# Patient Record
Sex: MALE | Race: WHITE | HISPANIC OR LATINO | URBAN - METROPOLITAN AREA
[De-identification: names, ages, dates, MRNs, and addresses within clinical notes are randomized per-mention and may not be internally consistent; named-entity substitution may affect disease eponyms.]

---

## 2023-04-27 ENCOUNTER — APPOINTMENT (RX ONLY)
Dept: URBAN - METROPOLITAN AREA CLINIC 133 | Facility: CLINIC | Age: 33
Setting detail: DERMATOLOGY
End: 2023-04-27

## 2023-04-27 DIAGNOSIS — L663 OTHER SPECIFIED DISEASES OF HAIR AND HAIR FOLLICLES: ICD-10-CM

## 2023-04-27 DIAGNOSIS — L738 OTHER SPECIFIED DISEASES OF HAIR AND HAIR FOLLICLES: ICD-10-CM

## 2023-04-27 DIAGNOSIS — L73.9 FOLLICULAR DISORDER, UNSPECIFIED: ICD-10-CM

## 2023-04-27 DIAGNOSIS — L72.8 OTHER FOLLICULAR CYSTS OF THE SKIN AND SUBCUTANEOUS TISSUE: ICD-10-CM

## 2023-04-27 PROBLEM — L02.223 FURUNCLE OF CHEST WALL: Status: ACTIVE | Noted: 2023-04-27

## 2023-04-27 PROCEDURE — ? ADDITIONAL NOTES

## 2023-04-27 PROCEDURE — ? INTRALESIONAL KENALOG

## 2023-04-27 PROCEDURE — ? COUNSELING

## 2023-04-27 PROCEDURE — ? PRESCRIPTION

## 2023-04-27 PROCEDURE — 99203 OFFICE O/P NEW LOW 30 MIN: CPT | Mod: 25

## 2023-04-27 PROCEDURE — 11900 INJECT SKIN LESIONS </W 7: CPT

## 2023-04-27 PROCEDURE — ? DEFER

## 2023-04-27 RX ORDER — CLINDAMYCIN PHOSPHATE 10 MG/ML
SOLUTION TOPICAL
Qty: 60 | Refills: 2 | Status: ERX | COMMUNITY
Start: 2023-04-27

## 2023-04-27 RX ADMIN — CLINDAMYCIN PHOSPHATE: 10 SOLUTION TOPICAL at 00:00

## 2023-04-27 ASSESSMENT — LOCATION ZONE DERM: LOCATION ZONE: TRUNK

## 2023-04-27 ASSESSMENT — LOCATION SIMPLE DESCRIPTION DERM: LOCATION SIMPLE: CHEST

## 2023-04-27 ASSESSMENT — LOCATION DETAILED DESCRIPTION DERM
LOCATION DETAILED: RIGHT MEDIAL INFERIOR CHEST
LOCATION DETAILED: STERNUM

## 2023-04-27 NOTE — PROCEDURE: DEFER
Procedure To Be Performed At Next Visit: Excision
Detail Level: Simple
X Size Of Lesion In Cm (Optional): 0
Introduction Text (Please End With A Colon): The following procedure was deferred: